# Patient Record
Sex: MALE | Race: WHITE | NOT HISPANIC OR LATINO | Employment: OTHER | URBAN - METROPOLITAN AREA
[De-identification: names, ages, dates, MRNs, and addresses within clinical notes are randomized per-mention and may not be internally consistent; named-entity substitution may affect disease eponyms.]

---

## 2023-05-09 ENCOUNTER — TELEPHONE (OUTPATIENT)
Dept: GASTROENTEROLOGY | Facility: CLINIC | Age: 68
End: 2023-05-09

## 2023-05-09 NOTE — TELEPHONE ENCOUNTER
[facility] GI progress note reviewed, notes state follow up in 6 weeks with EGD in 8 weeks  Reviewed with daughter Thien De La Torre and patient scheduled for 6/9/23 with Dr Ilan Mayorga  She was asked to call in if any issues with her father

## 2023-05-09 NOTE — TELEPHONE ENCOUNTER
Patients GI provider:  Dr Kacey Olivier    Number to return call: [phone]    Reason for call: Pt's daughter Geno Srinivasan called to schedule a hospital follow up  She states Dr Kacey Olivier told her he had to be seen within 2 weeks  Nothing available      Scheduled procedure/appointment date if applicable: N/A

## 2023-05-24 ENCOUNTER — DOCUMENTATION (OUTPATIENT)
Dept: GASTROENTEROLOGY | Facility: CLINIC | Age: 68
End: 2023-05-24

## 2023-05-24 RX ORDER — FLUTICASONE PROPIONATE 50 MCG
1 SPRAY, SUSPENSION (ML) NASAL DAILY
COMMUNITY

## 2023-05-24 RX ORDER — ALBUTEROL SULFATE 90 UG/1
2 AEROSOL, METERED RESPIRATORY (INHALATION) EVERY 6 HOURS PRN
COMMUNITY

## 2023-05-24 RX ORDER — VALACYCLOVIR HYDROCHLORIDE 500 MG/1
500 TABLET, FILM COATED ORAL 2 TIMES DAILY
COMMUNITY

## 2023-05-24 RX ORDER — ESOMEPRAZOLE MAGNESIUM 40 MG/1
40 CAPSULE, DELAYED RELEASE ORAL
COMMUNITY

## 2023-05-24 RX ORDER — UMECLIDINIUM BROMIDE AND VILANTEROL TRIFENATATE 62.5; 25 UG/1; UG/1
1 POWDER RESPIRATORY (INHALATION) DAILY
COMMUNITY

## 2023-05-24 RX ORDER — DOCUSATE CALCIUM 240 MG
240 CAPSULE ORAL 2 TIMES DAILY
COMMUNITY

## 2023-05-24 RX ORDER — BUPROPION HYDROCHLORIDE 150 MG/1
150 TABLET, EXTENDED RELEASE ORAL 2 TIMES DAILY
COMMUNITY

## 2023-05-24 RX ORDER — SILDENAFIL 100 MG/1
100 TABLET, FILM COATED ORAL DAILY PRN
COMMUNITY

## 2023-05-24 RX ORDER — ZOLPIDEM TARTRATE 12.5 MG/1
10 TABLET, FILM COATED, EXTENDED RELEASE ORAL
COMMUNITY

## 2023-06-09 ENCOUNTER — OFFICE VISIT (OUTPATIENT)
Dept: GASTROENTEROLOGY | Facility: CLINIC | Age: 68
End: 2023-06-09
Payer: MEDICARE

## 2023-06-09 ENCOUNTER — TELEPHONE (OUTPATIENT)
Dept: GASTROENTEROLOGY | Facility: CLINIC | Age: 68
End: 2023-06-09

## 2023-06-09 VITALS
BODY MASS INDEX: 18.5 KG/M2 | SYSTOLIC BLOOD PRESSURE: 114 MMHG | WEIGHT: 129.2 LBS | HEIGHT: 70 IN | DIASTOLIC BLOOD PRESSURE: 68 MMHG

## 2023-06-09 DIAGNOSIS — E87.6 HYPOKALEMIA: ICD-10-CM

## 2023-06-09 DIAGNOSIS — Z12.11 COLON CANCER SCREENING: ICD-10-CM

## 2023-06-09 DIAGNOSIS — J43.9 PULMONARY EMPHYSEMA, UNSPECIFIED EMPHYSEMA TYPE (HCC): ICD-10-CM

## 2023-06-09 DIAGNOSIS — E46 PROTEIN-CALORIE MALNUTRITION, UNSPECIFIED SEVERITY (HCC): ICD-10-CM

## 2023-06-09 DIAGNOSIS — K27.9 PEPTIC ULCER DISEASE: Primary | ICD-10-CM

## 2023-06-09 DIAGNOSIS — A49.8 CLOSTRIDIUM DIFFICILE INFECTION: ICD-10-CM

## 2023-06-09 PROCEDURE — 99214 OFFICE O/P EST MOD 30 MIN: CPT | Performed by: INTERNAL MEDICINE

## 2023-06-09 RX ORDER — MULTIVITAMIN WITH IRON
TABLET ORAL
COMMUNITY

## 2023-06-09 RX ORDER — PANTOPRAZOLE SODIUM 40 MG/1
40 TABLET, DELAYED RELEASE ORAL 2 TIMES DAILY
COMMUNITY
Start: 2023-06-06

## 2023-06-09 RX ORDER — POTASSIUM & SODIUM PHOSPHATES POWDER PACK 280-160-250 MG 280-160-250 MG
PACK ORAL
COMMUNITY
Start: 2023-05-09

## 2023-06-09 RX ORDER — MULTIVIT WITH MINERALS/LUTEIN
1000 TABLET ORAL DAILY
COMMUNITY

## 2023-06-09 NOTE — H&P (VIEW-ONLY)
[facility] [facility] - Outpatient Follow-up Note  Tomy Reynolds 69 y o  male MRN: [id number]  Encounter: [id number]    ASSESSMENT AND PLAN:      1  Peptic ulcer disease  --Patient with a history of perforated peptic ulcer disease  Patient required emergency surgery April 30, 2023  Has done well since that time  Now eating normally  Was having problems with gaining weight and having abdominal pain prior to that event  Had a recent C  difficile infection at that time   -Patient not taking any nonsteroidal anti-inflammatories  He has been maintained on pantoprazole 40 mg twice a day  We will proceed with EGD to document healing of the ulcer  In addition check records from [address] [place] [address] to see his H  pylori status  I suspect that if he had a positive result from his stool study he would have and sent home on antibiotics  In any event we will recheck at time of endoscopy for H  pylori with biopsy    - EGD; Future    2  Clostridium difficile infection  --Resolved at this time-diarrhea not a problem    3  Pulmonary emphysema, unspecified emphysema type ([facility] 75 )  --Patient with longstanding smoking history  Has recently discontinued  Not on home oxygen  Good exercise tolerance  Stable to have examination at outpatient center    4  Colon cancer screening  --Had studies at [place] for GI health  Negative colonoscopy for polyps within the last 2 years  Will confirm with previous records  -Last examination March 2022 no recall until 2027-did review relevant records in the EMR    5  Hypokalemia  ---Patient with potassium and low phosphorus-electrolyte imbalance during the time of his admission  Probably related to his sepsis  And poor nutrition  Recheck labs  Patient is on supplemental phosphorus at this time  - Phosphorus; Future  - Basic metabolic panel; Future  - Magnesium; Future    6   Protein-calorie malnutrition, unspecified severity ([facility] )  --- Really patient had really dropped significantly  Was down into the 1 teens  Now almost up to 130  Protein calorie malnutrition seems to be resolving      Followup Appointment: Pending EGD  ______________________________________________________________________    Chief Complaint   Patient presents with   • Follow up after Hospital visit     HPI: Patient seen in the office for follow-up visit after his recent station for perforated peptic ulcer disease  Patient had been battling with problems with C  difficile and was felt to have recurrent infection  He presented to the ED in late April with increasing abdominal pain work-up included a CT scan abdomen pelvis  He underwent exploratory laparoscopy on 30 April and originally at the site of perforation was not ascertained  Conversion to a laparotomy revealed presence of an ulcer that was tacked up against the liver  Prior to admission patient had significant problems with abdominal pain and losing weight  Since his surgery he has gained his weight back  He denies any problems with abdominal pain  He has been maintained on twice a day PPI  Patient did have electrolyte imbalances while in the hospital and hypophosphatemia  He has been getting supplemental phosphorus but has not had recent labs since his hospital discharge  Patient's other problems include emphysema and COPD  During the course of patient's hospitalization he had electrolyte imbalance and acidosis  Patient did have fairly rapid improvement after surgery  For the first time in months patient is beginning to gain weight  He denies any problems with abdominal pain  He is moving his bowels  He does have a history of emphysema and was a lifelong smoker but quit smoking since his surgery and is interested not resuming  It should be noted patient had not been taking nonsteroidal anti-inflammatories prior to surgery however at least as he recalls    In reviewing the EMR from [employer] I do not see a result for H  pylori stool "study  Upon discharge patient was advised to follow-up with us in schedule upper endoscopy  In review of records patient's ulcer was located in the pyloric region  Patient reports a history of polyps  I did review his chart from [place] for GI health  He had a colonoscopy March 2022  Only a few diverticuli were noted and patient had no polyps        Historical Information   Past Medical History:   Diagnosis Date   • Alpha-[facility]-antitrypsin deficiency ([facility] )    • Chronic viral hepatitis ([facility] )    • Cirrhosis of liver ([facility] )    • Constipation    • Hearing loss, unspecified hearing loss type, unspecified laterality    • Insomnia, unspecified    • Personal history of colonic polyps    • Pulmonary emphysema ([facility] )      History reviewed  No pertinent surgical history  Social History     Substance and Sexual Activity   Alcohol Use None     Social History     Substance and Sexual Activity   Drug Use Not on file     Social History     Tobacco Use   Smoking Status Not on file   Smokeless Tobacco Not on file     History reviewed  No pertinent family history  Current Outpatient Medications:   •  albuterol (ProAir HFA) 90 mcg/act inhaler  •  Ascorbic Acid (vitamin C) 1000 MG tablet  •  Cholecalciferol (Vitamin D3) 125 MCG (5000 UT) CAPS  •  lysine 500 MG TABS  •  Magnesium 250 MG TABS  •  pantoprazole (PROTONIX) 40 mg tablet  •  Phos-NaK 280-160-250 MG packet  •  umeclidinium-vilanterol (Anoro Ellipta) 62 5-25 mcg/actuation inhaler  •  valACYclovir (VALTREX) 500 mg tablet  •  zolpidem (AMBIEN CR) 12 5 MG CR tablet  •  buPROPion (ZYBAN) 150 MG 12 hr tablet  •  docusate calcium (SURFAK) 240 mg capsule  •  fluticasone (Flonase Allergy Relief) 50 mcg/act nasal spray  •  sildenafil (VIAGRA) 100 mg tablet  No Known Allergies  Reviewed medications and allergies and updated as indicated    PHYSICAL EXAM:    Blood pressure 114/68, height 5' 10\" (1 778 m), weight 58 6 kg (129 lb 3 2 oz)  Body mass index is 18 54 kg/m²    General " Appearance: NAD, cooperative, alert--mild temporal wasting  Eyes: Anicteric, -thin  ENT:  Normocephalic, atraumatic, normal mucosa  Neck:  Supple, symmetrical, trachea midline  Resp:  Clear to auscultation bilaterally; no rales, rhonchi or wheezing; respirations unlabored--increased AP diameter   CV:  S1 S2, Regular rate and rhythm; no murmur, rub, or gallop  GI:  Soft, non-tender, non-distended; normal bowel sounds; no masses, no organomegaly -midline surgical scar around the area in the umbilicus  Rectal: Deferred  Musculoskeletal: No cyanosis, clubbing or edema  Normal ROM    Skin:  No jaundice, rashes, or lesions   Heme/Lymph: No palpable cervical lymphadenopathy  Psych: Normal affect, good eye contact  Neuro: No gross deficits, AAOx3

## 2023-06-09 NOTE — PROGRESS NOTES
[facility] [facility] Specialists - Outpatient Follow-up Note  Portia Larson 69 y o  male MRN: [id number]  Encounter: [id number]    ASSESSMENT AND PLAN:      1  Peptic ulcer disease  --Patient with a history of perforated peptic ulcer disease  Patient required emergency surgery April 30, 2023  Has done well since that time  Now eating normally  Was having problems with gaining weight and having abdominal pain prior to that event  Had a recent C  difficile infection at that time   -Patient not taking any nonsteroidal anti-inflammatories  He has been maintained on pantoprazole 40 mg twice a day  We will proceed with EGD to document healing of the ulcer  In addition check records from [address] [place] [address] to see his H  pylori status  I suspect that if he had a positive result from his stool study he would have and sent home on antibiotics  In any event we will recheck at time of endoscopy for H  pylori with biopsy    - EGD; Future    2  Clostridium difficile infection  --Resolved at this time-diarrhea not a problem    3  Pulmonary emphysema, unspecified emphysema type ([facility] 75 )  --Patient with longstanding smoking history  Has recently discontinued  Not on home oxygen  Good exercise tolerance  Stable to have examination at outpatient center    4  Colon cancer screening  --Had studies at [place] for GI health  Negative colonoscopy for polyps within the last 2 years  Will confirm with previous records  -Last examination March 2022 no recall until 2027-did review relevant records in the EMR    5  Hypokalemia  ---Patient with potassium and low phosphorus-electrolyte imbalance during the time of his admission  Probably related to his sepsis  And poor nutrition  Recheck labs  Patient is on supplemental phosphorus at this time  - Phosphorus; Future  - Basic metabolic panel; Future  - Magnesium; Future    6   Protein-calorie malnutrition, unspecified severity ([facility] )  --- Really patient had really dropped significantly  Was down into the 1 teens  Now almost up to 130  Protein calorie malnutrition seems to be resolving      Followup Appointment: Pending EGD  ______________________________________________________________________    Chief Complaint   Patient presents with   • Follow up after Hospital visit     HPI: Patient seen in the office for follow-up visit after his recent station for perforated peptic ulcer disease  Patient had been battling with problems with C  difficile and was felt to have recurrent infection  He presented to the ED in late April with increasing abdominal pain work-up included a CT scan abdomen pelvis  He underwent exploratory laparoscopy on 30 April and originally at the site of perforation was not ascertained  Conversion to a laparotomy revealed presence of an ulcer that was tacked up against the liver  Prior to admission patient had significant problems with abdominal pain and losing weight  Since his surgery he has gained his weight back  He denies any problems with abdominal pain  He has been maintained on twice a day PPI  Patient did have electrolyte imbalances while in the hospital and hypophosphatemia  He has been getting supplemental phosphorus but has not had recent labs since his hospital discharge  Patient's other problems include emphysema and COPD  During the course of patient's hospitalization he had electrolyte imbalance and acidosis  Patient did have fairly rapid improvement after surgery  For the first time in months patient is beginning to gain weight  He denies any problems with abdominal pain  He is moving his bowels  He does have a history of emphysema and was a lifelong smoker but quit smoking since his surgery and is interested not resuming  It should be noted patient had not been taking nonsteroidal anti-inflammatories prior to surgery however at least as he recalls    In reviewing the EMR from [employer] I do not see a result for H  pylori stool "study  Upon discharge patient was advised to follow-up with us in schedule upper endoscopy  In review of records patient's ulcer was located in the pyloric region  Patient reports a history of polyps  I did review his chart from [place] for GI health  He had a colonoscopy March 2022  Only a few diverticuli were noted and patient had no polyps        Historical Information   Past Medical History:   Diagnosis Date   • Alpha-1-antitrypsin deficiency ([facility] )    • Chronic viral hepatitis ([facility] )    • Cirrhosis of liver ([facility] )    • Constipation    • Hearing loss, unspecified hearing loss type, unspecified laterality    • Insomnia, unspecified    • Personal history of colonic polyps    • Pulmonary emphysema ([facility] 75 )      History reviewed  No pertinent surgical history  Social History     Substance and Sexual Activity   Alcohol Use None     Social History     Substance and Sexual Activity   Drug Use Not on file     Social History     Tobacco Use   Smoking Status Not on file   Smokeless Tobacco Not on file     History reviewed  No pertinent family history  Current Outpatient Medications:   •  albuterol (ProAir HFA) 90 mcg/act inhaler  •  Ascorbic Acid (vitamin C) 1000 MG tablet  •  Cholecalciferol (Vitamin D3) 125 MCG (5000 UT) CAPS  •  lysine 500 MG TABS  •  Magnesium 250 MG TABS  •  pantoprazole (PROTONIX) 40 mg tablet  •  Phos-NaK 280-160-250 MG packet  •  umeclidinium-vilanterol (Anoro Ellipta) 62 5-25 mcg/actuation inhaler  •  valACYclovir (VALTREX) 500 mg tablet  •  zolpidem (AMBIEN CR) 12 5 MG CR tablet  •  buPROPion (ZYBAN) 150 MG 12 hr tablet  •  docusate calcium (SURFAK) 240 mg capsule  •  fluticasone (Flonase Allergy Relief) 50 mcg/act nasal spray  •  sildenafil (VIAGRA) 100 mg tablet  No Known Allergies  Reviewed medications and allergies and updated as indicated    PHYSICAL EXAM:    Blood pressure 114/68, height 5' 10\" (1 778 m), weight 58 6 kg (129 lb 3 2 oz)  Body mass index is 18 54 kg/m²    General " Appearance: NAD, cooperative, alert--mild temporal wasting  Eyes: Anicteric, -thin  ENT:  Normocephalic, atraumatic, normal mucosa  Neck:  Supple, symmetrical, trachea midline  Resp:  Clear to auscultation bilaterally; no rales, rhonchi or wheezing; respirations unlabored--increased AP diameter   CV:  S1 S2, Regular rate and rhythm; no murmur, rub, or gallop  GI:  Soft, non-tender, non-distended; normal bowel sounds; no masses, no organomegaly -midline surgical scar around the area in the umbilicus  Rectal: Deferred  Musculoskeletal: No cyanosis, clubbing or edema  Normal ROM    Skin:  No jaundice, rashes, or lesions   Heme/Lymph: No palpable cervical lymphadenopathy  Psych: Normal affect, good eye contact  Neuro: No gross deficits, AAOx3

## 2023-06-09 NOTE — PATIENT INSTRUCTIONS
[facility] Specialists - Outpatient Follow-up Note  Ann Marie Addison 69 y o  male MRN: [id number]  Encounter: [id number]    ASSESSMENT AND PLAN:      1  Peptic ulcer disease  --Patient with a history of perforated peptic ulcer disease  Patient required emergency surgery April 30, 2023  Has done well since that time  Now eating normally  Was having problems with gaining weight and having abdominal pain prior to that event  Had a recent C  difficile infection at that time   -Patient not taking any nonsteroidal anti-inflammatories  He has been maintained on pantoprazole 40 mg twice a day  We will proceed with EGD to document healing of the ulcer  In addition check records from [address] [place] [address] to see his H  pylori status  I suspect that if he had a positive result from his stool study he would have and sent home on antibiotics  In any event we will recheck at time of endoscopy for H  pylori with biopsy    - EGD; Future    2  Clostridium difficile infection  --Resolved at this time-diarrhea not a problem    3  Pulmonary emphysema, unspecified emphysema type ([facility] 75 )  --Patient with longstanding smoking history  Has recently discontinued  Not on home oxygen  Good exercise tolerance  Stable to have examination at outpatient center    4  Colon cancer screening  --Had studies at [place] for GI health  Negative colonoscopy for polyps within the last 2 years  Will confirm with previous records    5  Hypokalemia  ---Patient with potassium and low phosphorus-electrolyte imbalance during the time of his admission  Probably related to his sepsis  And poor nutrition  Recheck labs  Patient is on supplemental phosphorus at this time  - Phosphorus; Future  - Basic metabolic panel; Future  - Magnesium; Future    6  Protein-calorie malnutrition, unspecified severity ([facility] 75 )  --- Really patient had really dropped significantly  Was down into the 1 teens  Now almost up to 130    Protein calorie malnutrition seems to be resolving      Followup Appointment: Pending EGD

## 2023-06-09 NOTE — TELEPHONE ENCOUNTER
Scheduled date of EGD(as of today):6-15-23  [facility] ([facility])  Instructions reviewed with patient by:JOVANY  Clearances: NO

## 2023-06-15 ENCOUNTER — ANESTHESIA EVENT (OUTPATIENT)
Dept: GASTROENTEROLOGY | Facility: AMBULATORY SURGERY CENTER | Age: 68
End: 2023-06-15

## 2023-06-15 ENCOUNTER — HOSPITAL ENCOUNTER (OUTPATIENT)
Dept: GASTROENTEROLOGY | Facility: AMBULATORY SURGERY CENTER | Age: 68
Discharge: HOME | End: 2023-06-15
Payer: MEDICARE

## 2023-06-15 ENCOUNTER — ANESTHESIA (OUTPATIENT)
Dept: GASTROENTEROLOGY | Facility: AMBULATORY SURGERY CENTER | Age: 68
End: 2023-06-15

## 2023-06-15 VITALS
RESPIRATION RATE: 20 BRPM | BODY MASS INDEX: 18.61 KG/M2 | DIASTOLIC BLOOD PRESSURE: 84 MMHG | HEART RATE: 66 BPM | SYSTOLIC BLOOD PRESSURE: 123 MMHG | WEIGHT: 130 LBS | TEMPERATURE: 97.6 F | HEIGHT: 70 IN | OXYGEN SATURATION: 99 %

## 2023-06-15 DIAGNOSIS — K27.9 PEPTIC ULCER DISEASE: ICD-10-CM

## 2023-06-15 PROCEDURE — 43239 EGD BIOPSY SINGLE/MULTIPLE: CPT | Performed by: INTERNAL MEDICINE

## 2023-06-15 PROCEDURE — 88313 SPECIAL STAINS GROUP 2: CPT | Performed by: PATHOLOGY

## 2023-06-15 PROCEDURE — 88305 TISSUE EXAM BY PATHOLOGIST: CPT | Performed by: PATHOLOGY

## 2023-06-15 RX ORDER — SODIUM CHLORIDE, SODIUM LACTATE, POTASSIUM CHLORIDE, CALCIUM CHLORIDE 600; 310; 30; 20 MG/100ML; MG/100ML; MG/100ML; MG/100ML
20 INJECTION, SOLUTION INTRAVENOUS CONTINUOUS
Status: DISCONTINUED | OUTPATIENT
Start: 2023-06-15 | End: 2023-06-19 | Stop reason: HOSPADM

## 2023-06-15 RX ORDER — PROPOFOL 10 MG/ML
INJECTION, EMULSION INTRAVENOUS AS NEEDED
Status: DISCONTINUED | OUTPATIENT
Start: 2023-06-15 | End: 2023-06-15

## 2023-06-15 RX ORDER — SODIUM CHLORIDE, SODIUM LACTATE, POTASSIUM CHLORIDE, CALCIUM CHLORIDE 600; 310; 30; 20 MG/100ML; MG/100ML; MG/100ML; MG/100ML
50 INJECTION, SOLUTION INTRAVENOUS CONTINUOUS
Status: DISCONTINUED | OUTPATIENT
Start: 2023-06-15 | End: 2023-06-15

## 2023-06-15 RX ADMIN — SODIUM CHLORIDE, SODIUM LACTATE, POTASSIUM CHLORIDE, CALCIUM CHLORIDE: 600; 310; 30; 20 INJECTION, SOLUTION INTRAVENOUS at 11:32

## 2023-06-15 RX ADMIN — PROPOFOL 50 MG: 10 INJECTION, EMULSION INTRAVENOUS at 11:29

## 2023-06-15 RX ADMIN — PROPOFOL 100 MG: 10 INJECTION, EMULSION INTRAVENOUS at 11:26

## 2023-06-15 RX ADMIN — SODIUM CHLORIDE, SODIUM LACTATE, POTASSIUM CHLORIDE, CALCIUM CHLORIDE 50 ML/HR: 600; 310; 30; 20 INJECTION, SOLUTION INTRAVENOUS at 10:53

## 2023-06-15 NOTE — ANESTHESIA POSTPROCEDURE EVALUATION
Post-Op Assessment Note    CV Status:  Stable  Pain Score: 0    Pain management: adequate     Mental Status:  Alert and awake   Hydration Status:  Euvolemic   PONV Controlled:  Controlled   Airway Patency:  Patent      Post Op Vitals Reviewed: Yes      Staff: CRNA         No notable events documented      BP   110/72   Temp   98   Pulse  65   Resp   16   SpO2   98

## 2023-06-15 NOTE — ANESTHESIA PREPROCEDURE EVALUATION
Procedure:  EGD    Relevant Problems   No relevant active problems      Alpha-1-antitrypsin deficiency  Emphysema - no home O2 use   Cirrhosis of the liver  Chronic viral hepatitis  Bilateral hearing loss  Peptic ulcer disease  Patient quit smoking in 4/2023 - still vapes occasionally and wears a nicotine patch      Physical Exam    Airway    Mallampati score: III  TM Distance: <3 FB  Neck ROM: full     Dental   Comment: None loose, No notable dental hx     Cardiovascular      Pulmonary      Other Findings        Anesthesia Plan  ASA Score- 3     Anesthesia Type- IV sedation with anesthesia with ASA Monitors  Additional Monitors:   Airway Plan:     Comment: Npo after MN    Patient educated on the possibility for awareness under sedation and of the possibility of airway intervention in the event of an airway or procedural emergency    Plan Factors-Exercise tolerance (METS): >4 METS  Chart reviewed  Patient summary reviewed  Patient is not a current smoker  Induction- intravenous  Postoperative Plan-     Informed Consent- Anesthetic plan and risks discussed with patient  I personally reviewed this patient with the CRNA  Discussed and agreed on the Anesthesia Plan with the BHARATH Lozada

## 2023-06-15 NOTE — INTERVAL H&P NOTE
H&P reviewed  After examining the patient I find no changes in the patients condition since the H&P had been written      Vitals:    06/15/23 1046   BP: 120/78   Pulse: 69   Resp: 14   Temp: 97 6 °F (36 4 °C)   SpO2: 97%

## 2023-06-19 PROCEDURE — 88313 SPECIAL STAINS GROUP 2: CPT | Performed by: PATHOLOGY

## 2023-06-19 PROCEDURE — 88305 TISSUE EXAM BY PATHOLOGIST: CPT | Performed by: PATHOLOGY

## 2023-06-20 NOTE — RESULT ENCOUNTER NOTE
I called the patient and reviewed path  He has a history of ulcer disease  H  pylori negative  Mild reactive changes  Continue Protonix 40 mg daily  Follow-up in the office 6 months  No recall EGD copy patient's PCP    Thank you

## 2025-08-15 DIAGNOSIS — F51.01 PRIMARY INSOMNIA: ICD-10-CM

## 2025-08-15 DIAGNOSIS — J44.9 CHRONIC OBSTRUCTIVE PULMONARY DISEASE, UNSPECIFIED COPD TYPE (HCC): Primary | ICD-10-CM

## 2025-08-17 RX ORDER — ZOLPIDEM TARTRATE 12.5 MG/1
12.5 TABLET, FILM COATED, EXTENDED RELEASE ORAL
Qty: 30 TABLET | Refills: 0 | Status: SHIPPED | OUTPATIENT
Start: 2025-08-17

## 2025-08-17 RX ORDER — UMECLIDINIUM BROMIDE AND VILANTEROL TRIFENATATE 62.5; 25 UG/1; UG/1
1 POWDER RESPIRATORY (INHALATION) DAILY
Qty: 60 BLISTER | Refills: 0 | Status: SHIPPED | OUTPATIENT
Start: 2025-08-17

## 2025-08-21 ENCOUNTER — TELEPHONE (OUTPATIENT)
Age: 70
End: 2025-08-21

## 2025-08-22 ENCOUNTER — TELEPHONE (OUTPATIENT)
Age: 70
End: 2025-08-22